# Patient Record
Sex: MALE | Race: WHITE | NOT HISPANIC OR LATINO | URBAN - METROPOLITAN AREA
[De-identification: names, ages, dates, MRNs, and addresses within clinical notes are randomized per-mention and may not be internally consistent; named-entity substitution may affect disease eponyms.]

---

## 2023-04-04 ENCOUNTER — APPOINTMENT (RX ONLY)
Dept: URBAN - METROPOLITAN AREA CLINIC 23 | Facility: CLINIC | Age: 48
Setting detail: DERMATOLOGY
End: 2023-04-04

## 2023-04-04 DIAGNOSIS — L72.8 OTHER FOLLICULAR CYSTS OF THE SKIN AND SUBCUTANEOUS TISSUE: ICD-10-CM

## 2023-04-04 PROBLEM — D48.5 NEOPLASM OF UNCERTAIN BEHAVIOR OF SKIN: Status: ACTIVE | Noted: 2023-04-04

## 2023-04-04 PROCEDURE — ? ADDITIONAL NOTES

## 2023-04-04 PROCEDURE — ? ORDER ULTRASOUND

## 2023-04-04 PROCEDURE — ? COUNSELING

## 2023-04-04 PROCEDURE — 99202 OFFICE O/P NEW SF 15 MIN: CPT

## 2023-04-04 ASSESSMENT — LOCATION DETAILED DESCRIPTION DERM: LOCATION DETAILED: MID POSTERIOR NECK

## 2023-04-04 ASSESSMENT — LOCATION ZONE DERM: LOCATION ZONE: NECK

## 2023-04-04 ASSESSMENT — LOCATION SIMPLE DESCRIPTION DERM: LOCATION SIMPLE: POSTERIOR NECK

## 2023-04-04 NOTE — PROCEDURE: ORDER ULTRASOUND
Provider: Madelin Patel NP
Detail Level: Simple
Skin Lesion Ultrasound Reason: 5cm enlarging subcutaneous mass
Priority: normal
Head Ultrasound Reason: The patient has a painful enlarging subQ nodule on occipital scalp measuring ~3cm
Ultrasound Protocol: Ultrasound of Head

## 2023-04-04 NOTE — PROCEDURE: ADDITIONAL NOTES
Additional Notes: Size of cyst : 3cm. Pending ultrasound results will tailor future care.
Detail Level: Simple
Render Risk Assessment In Note?: no